# Patient Record
Sex: MALE | Race: AMERICAN INDIAN OR ALASKA NATIVE
[De-identification: names, ages, dates, MRNs, and addresses within clinical notes are randomized per-mention and may not be internally consistent; named-entity substitution may affect disease eponyms.]

---

## 2018-08-03 ENCOUNTER — HOSPITAL ENCOUNTER (EMERGENCY)
Dept: HOSPITAL 5 - ED | Age: 43
Discharge: HOME | End: 2018-08-03
Payer: SELF-PAY

## 2018-08-03 VITALS — DIASTOLIC BLOOD PRESSURE: 73 MMHG | SYSTOLIC BLOOD PRESSURE: 104 MMHG
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
## 2018-08-03 DIAGNOSIS — T15.01XA: Primary | ICD-10-CM
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
## 2018-08-03 DIAGNOSIS — F17.200: ICD-10-CM
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
## 2018-08-03 PROCEDURE — 99283 EMERGENCY DEPT VISIT LOW MDM: CPT

## 2018-08-03 NOTE — EMERGENCY DEPARTMENT REPORT
ED Eye Problem HPI





- General


Chief complaint: Eye Problems


Stated complaint: EYE PAIN


Time Seen by Provider: 08/03/18 20:41


Source: patient


Mode of arrival: Ambulatory


Limitations: No Limitations





- Related Data


 Allergies











Allergy/AdvReac Type Severity Reaction Status Date / Time


 


No Known Allergies Allergy   Verified 08/03/18 20:15














ED Review of Systems


ROS: 


Stated complaint: EYE PAIN


Other details as noted in HPI








ED Past Medical Hx





- Past Medical History


Previous Medical History?: No





- Surgical History


Additional Surgical History: hand surgery





- Social History


Smoking Status: Current Every Day Smoker


Substance Use Type: Alcohol





ED Physical Exam





- General


Limitations: No Limitations





ED Course





 Vital Signs











  08/03/18





  18:10


 


Temperature 98.3 F


 


Pulse Rate 81


 


Respiratory 16





Rate 


 


Blood Pressure 104/73


 


O2 Sat by Pulse 98





Oximetry 











Critical care attestation.: 


If time is entered above; I have spent that time in minutes in the direct care 

of this critically ill patient, excluding procedure time.








ED Disposition


Condition: Stable


Referrals: 


PRIMARY CARE,MD [Primary Care Provider] - 3-5 Days

## 2018-08-03 NOTE — EMERGENCY DEPARTMENT REPORT
ED Eye Problem HPI





- General


Chief complaint: Eye Problems


Stated complaint: EYE PAIN


Time Seen by Provider: 08/03/18 20:41


Source: patient


Mode of arrival: Ambulatory


Limitations: No Limitations





- Related Data


 Previous Rx's











 Medication  Instructions  Recorded  Last Taken  Type


 


Moxifloxacin HCl [Vigamox 0.5%] 4 drop OU Q8HR 10 Days #1 bottle 08/03/18 

Unknown Rx











 Allergies











Allergy/AdvReac Type Severity Reaction Status Date / Time


 


No Known Allergies Allergy   Verified 08/03/18 20:15














ED Review of Systems


ROS: 


Stated complaint: EYE PAIN


Other details as noted in HPI








ED Past Medical Hx





- Past Medical History


Previous Medical History?: No





- Surgical History


Additional Surgical History: hand surgery





- Social History


Smoking Status: Current Every Day Smoker


Substance Use Type: Alcohol





- Medications


Home Medications: 


 Home Medications











 Medication  Instructions  Recorded  Confirmed  Last Taken  Type


 


Moxifloxacin HCl [Vigamox 0.5%] 4 drop OU Q8HR 10 Days #1 bottle 08/03/18  

Unknown Rx














ED Physical Exam





- General


Limitations: No Limitations





- Eye


Eye exam: Present: PERRL, EOMI





- Expanded Eye Exam


  ** Expanded


Sclera/Conjunctival: Injection: Right, Foreign Body: Right (Nasal 5 oclock 

position approximately less than 0.5 cm diameter metal FB.  looks superficial 

but partially embedded in cornea over iris.  Small piece of metal removed with q

-tip after staining eye with tetracaine and flouroscein.  patient reports that 

he feels some symptom improvement but continues to feel a discomfort in the 

area where the FB was visualized)


With correction: No





- Other


Other exam information: 





GENERAL: Patient in no acute distress


HEAD: Normocephalic, atraumatic


EYES: PERRLA, EOM intact, no scleral icterus, visual fields and acuity wnl


NOSE: No tenderness, discharge, sinus tenderness


MOUTH: No erythema, bleeding, exudate


HEART: no edema, circulation grossly normal


LUNGS:  no respirtatory distress


MUSCULOSKELETAL: Normal joint range of motion, no redness, no swelling, no 

tenderness


NEUROLOGIC: GCS 15, Alert and Oriented x3, Cranial nerves intact, normal 

sensation, normal strength, normal gait, no cerebellar deficit


SKIN: Skin is warm and dry, no wounds, no rashes








EYES: visual fields and acuity grossly normal








ED Course


 Vital Signs











  08/03/18





  18:10


 


Temperature 98.3 F


 


Pulse Rate 81


 


Respiratory 16





Rate 


 


Blood Pressure 104/73


 


O2 Sat by Pulse 98





Oximetry 














ED Medical Decision Making





- Medical Decision Making





At 2147 Dr. Janel Barlow [facility] call back updated. Recommends 

Vigamox eye drop, and discharge with optho follow up on Monday.  If symptoms 

are worsening over the weekend, recommends the patient return to [facility] or [place] [facility] 

[facility] for emergent optho eval.  If symptoms are improving over weekend ok for 

optho follow up on Monday morning.  Reports office address [facility] at [phone]


[address] [id number] [phone].





Patient comfortable updated with results.  Plan discharge with outpatient 

follow up.  Return if worsening.  Agrees with plan. 


Critical care attestation.: 


If time is entered above; I have spent that time in minutes in the direct care 

of this critically ill patient, excluding procedure time.








ED Disposition


Clinical Impression: 


Corneal foreign body with residual material


Qualifiers:


 Encounter type: initial encounter Laterality: right Qualified Code(s): 

T15.01XA - Foreign body in cornea, right eye, initial encounter





Disposition: DC-01 TO HOME OR SELFCARE


Is pt being admited?: No


Condition: Stable


Instructions:  Eye Foreign Body (ED)


Prescriptions: 


Moxifloxacin HCl [Vigamox 0.5%] 4 drop OU Q8HR 10 Days #1 bottle


Referrals: 


PRIMARY CARE,MD [Primary Care Provider] - 2-3 Days


Janel Barlow [Other] - 2-3 Days


Forms:  Work/School Release Form(ED)


Time of Disposition: 21:52